# Patient Record
Sex: FEMALE | Race: BLACK OR AFRICAN AMERICAN | NOT HISPANIC OR LATINO | URBAN - NONMETROPOLITAN AREA
[De-identification: names, ages, dates, MRNs, and addresses within clinical notes are randomized per-mention and may not be internally consistent; named-entity substitution may affect disease eponyms.]

---

## 2024-06-13 ENCOUNTER — OFFICE (OUTPATIENT)
Dept: URBAN - NONMETROPOLITAN AREA CLINIC 1 | Facility: CLINIC | Age: 49
End: 2024-06-13
Payer: COMMERCIAL

## 2024-06-13 VITALS
DIASTOLIC BLOOD PRESSURE: 97 MMHG | SYSTOLIC BLOOD PRESSURE: 146 MMHG | WEIGHT: 207 LBS | HEART RATE: 70 BPM | HEIGHT: 71 IN

## 2024-06-13 DIAGNOSIS — K59.09 OTHER CONSTIPATION: ICD-10-CM

## 2024-06-13 DIAGNOSIS — I10 ESSENTIAL (PRIMARY) HYPERTENSION: ICD-10-CM

## 2024-06-13 PROCEDURE — 99204 OFFICE O/P NEW MOD 45 MIN: CPT | Performed by: NURSE PRACTITIONER

## 2024-06-13 NOTE — SERVICEHPINOTES
Patient with a history of brain aneurysm, HTN, HLD, and DM presents to the clinic today for colonoscopy consult.  She has never had colonoscopy in the past.  She denies melena, hematochezia, n/v/d, unintentional weight loss, change in BM, abdominal pain, or fever.  She has chronic constipation and uses enemas prn.  She has a BM 3-4 weekly, will strain occasionally with defecation.  Also takes HCD daily for chronic pain.  Denies any upper gi complaints to speak of.  Denies a family history of crc.  Denies cardiac stents, anticoagulation therapy, ckd, or supplemental oxygenation use.

## 2024-06-13 NOTE — SERVICENOTES
Risks of procedure explained to patient and she wishes to proceed
Bowel prep prescribed and instructions given to patient
Samples of linzess 72 provided to patient today-take one cap po qd for constipation
Increase fiber and water intake daily
Avoid all nsaids
HTN-pcp managing
Hold Ozempic 7 days ac procedure